# Patient Record
Sex: FEMALE | Race: WHITE
[De-identification: names, ages, dates, MRNs, and addresses within clinical notes are randomized per-mention and may not be internally consistent; named-entity substitution may affect disease eponyms.]

---

## 2019-03-13 ENCOUNTER — HOSPITAL ENCOUNTER (EMERGENCY)
Dept: HOSPITAL 25 - UCCORT | Age: 19
Discharge: HOME | End: 2019-03-13
Payer: COMMERCIAL

## 2019-03-13 VITALS — DIASTOLIC BLOOD PRESSURE: 86 MMHG | SYSTOLIC BLOOD PRESSURE: 137 MMHG

## 2019-03-13 DIAGNOSIS — J06.9: Primary | ICD-10-CM

## 2019-03-13 PROCEDURE — 99201: CPT

## 2019-03-13 PROCEDURE — 71046 X-RAY EXAM CHEST 2 VIEWS: CPT

## 2019-03-13 PROCEDURE — G0463 HOSPITAL OUTPT CLINIC VISIT: HCPCS

## 2019-03-13 NOTE — UC
Respiratory Complaint HPI





- HPI Summary


HPI Summary: 





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 

week and then the past couple of days she states she's had more chest 

congestion and occasional tight cough.  She denies any asthma and is a 

nonsmoker.  Her cough has been productive but only of clear sputum.





- History of Current Complaint


Chief Complaint: UCRespiratory


Stated Complaint: COUGH


Time Seen by Provider: 03/13/19 17:11


Hx Obtained From: Patient


Hx Last Menstrual Period: 03/12/19


Pregnant?: No


Onset/Duration: Gradual Onset - Cold symptoms last week


Timing: Intermittent Episodes


Severity Initially: Mild


Severity Currently: Mild


Pain Intensity: 0


Character: Cough: Productive - To cough up clear sputum


Aggravating Factors: Nothing


Alleviating Factors: Nothing


Associated Signs And Symptoms: Positive: URI - URI last week





- Allergies/Home Medications


Allergies/Adverse Reactions: 


 Allergies











Allergy/AdvReac Type Severity Reaction Status Date / Time


 


No Known Allergies Allergy   Verified 03/13/19 17:04











Home Medications: 


 Home Medications





Norethindr/Eth Estradiol(Nf) [Lo Loestrin Fe (NF)] 1 tab PO DAILY 03/13/19 [

History Confirmed 03/13/19]











PMH/Surg Hx/FS Hx/Imm Hx


Previously Healthy: Yes





- Surgical History


Surgical History: None





- Social History


Occupation: Student


Lives: Dormitory/Roommates


Alcohol Use: None


Substance Use Type: None


Smoking Status (MU): Never Smoked Tobacco





Review of Systems


All Other Systems Reviewed And Are Negative: Yes


Constitutional: Positive: Negative


Skin: Positive: Negative


Eyes: Positive: Negative


ENT: Positive: Nasal Discharge - Upper respiratory Illness last week


Respiratory: Positive: Cough - Productive cough of clear sputum, no difficulty 

breathing or shortness of breath.


Cardiovascular: Positive: Negative


Gastrointestinal: Positive: Negative


Genitourinary: Positive: Negative


Motor: Positive: Negative


Neurovascular: Positive: Negative


Musculoskeletal: Positive: Negative


Neurological: Positive: Negative





Physical Exam


Triage Information Reviewed: Yes


Appearance: Well-Appearing, No Pain Distress, Well-Nourished


Vital Signs: 


 Initial Vital Signs











Temp  97.2 F   03/13/19 17:03


 


Pulse  90   03/13/19 17:03


 


Resp  14   03/13/19 17:03


 


BP  137/86   03/13/19 17:03


 


Pulse Ox  99   03/13/19 17:03











Vital Signs Reviewed: Yes


Eye Exam: Normal


ENT Exam: Normal


Neck exam: Normal


Respiratory: Positive: Lungs clear, Normal breath sounds, No respiratory 

distress, No accessory muscle use


Cardiovascular Exam: Normal


Abdominal Exam: Normal


Bowel Sounds: Positive: Present


Musculoskeletal Exam: Normal


Neurological Exam: Normal


Psychological Exam: Normal


Skin Exam: Normal





Respiratory Course/Dx





- Course


Course Of Treatment: Patient has been comfortable here.  I believe this is a 

viral illness.





- Differential Dx/Diagnosis


Provider Diagnosis: 


 URI (upper respiratory infection)








Discharge





- Sign-Out/Discharge


Documenting (check all that apply): Patient Departure


All imaging exams completed and their final reports reviewed: No Studies





- Discharge Plan


Condition: Good


Disposition: HOME


Patient Education Materials:  Upper Respiratory Infection (DC)


Referrals: 


No Primary Care Phys,NOPCP [Primary Care Provider] - 


MELECIO WALKER [[employer], [employer], OTHER] - 


Additional Instructions: 


Increase fluids.  She may take over-the-counter cold medicine for her cough.  

Definite follow-up at the [facility] as needed or follow up with her 

primary care provider at home next week.





- Billing Disposition and Condition


Condition: GOOD


Disposition: Home

## 2019-03-14 NOTE — UC
- EKG/XRAY/CT


Xray Comments: read yesterday





Course/Dx





- Diagnoses


Provider Diagnoses: 


 URI (upper respiratory infection)








Discharge





- Sign-Out/Discharge


Documenting (check all that apply): Post-Discharge Follow Up


All imaging exams completed and their final reports reviewed: Yes





- Discharge Plan


Condition: Good


Disposition: HOME


Patient Education Materials:  Upper Respiratory Infection (DC)


Referrals: 


No Primary Care Phys,NOPCP [Primary Care Provider] - 


MELECIO WALKER [[employer], OTHER] - 


Additional Instructions: 


Increase fluids.  She may take over-the-counter cold medicine for her cough.  

Definite follow-up at the [facility] as needed or follow up with her 

primary care provider at home next week.





- Billing Disposition and Condition


Condition: GOOD


Disposition: Home